# Patient Record
Sex: MALE | Race: WHITE | NOT HISPANIC OR LATINO | URBAN - METROPOLITAN AREA
[De-identification: names, ages, dates, MRNs, and addresses within clinical notes are randomized per-mention and may not be internally consistent; named-entity substitution may affect disease eponyms.]

---

## 2024-10-07 ENCOUNTER — OFFICE VISIT (OUTPATIENT)
Dept: URGENT CARE | Age: 59
End: 2024-10-07
Payer: COMMERCIAL

## 2024-10-07 VITALS
SYSTOLIC BLOOD PRESSURE: 115 MMHG | RESPIRATION RATE: 17 BRPM | HEART RATE: 104 BPM | OXYGEN SATURATION: 98 % | TEMPERATURE: 98.2 F | WEIGHT: 185 LBS | DIASTOLIC BLOOD PRESSURE: 77 MMHG

## 2024-10-07 DIAGNOSIS — R22.41 LOCALIZED SWELLING OF RIGHT FOOT: Primary | ICD-10-CM

## 2024-10-07 PROCEDURE — 99213 OFFICE O/P EST LOW 20 MIN: CPT

## 2024-10-07 ASSESSMENT — ENCOUNTER SYMPTOMS: COLOR CHANGE: 1

## 2024-10-07 NOTE — PROGRESS NOTES
Subjective   Patient ID: Nathanael Ty is a 59 y.o. male. They present today with a chief complaint of Foot Pain, Bleeding/Bruising, and veins swelling in feet.    History of Present Illness  Patient reports varicose veins in right foot and right foot has been swelling and is hard to walk on , no injury.            Past Medical History  Allergies as of 10/07/2024    (No Known Allergies)       (Not in a hospital admission)       No past medical history on file.    No past surgical history on file.         Review of Systems  Review of Systems   Skin:  Positive for color change.   All other systems reviewed and are negative.                                 Objective    Vitals:    10/07/24 1651   BP: 115/77   BP Location: Right arm   Pulse: 104   Resp: 17   Temp: 36.8 °C (98.2 °F)   SpO2: 98%   Weight: 83.9 kg (185 lb)     No LMP for male patient.    Physical Exam  Vitals reviewed.   Constitutional:       Appearance: Normal appearance.   HENT:      Head: Normocephalic.   Cardiovascular:      Rate and Rhythm: Normal rate and regular rhythm.      Pulses: Normal pulses.      Heart sounds: Normal heart sounds.   Pulmonary:      Effort: Pulmonary effort is normal.      Breath sounds: Normal breath sounds.   Musculoskeletal:         General: Swelling and tenderness present. Normal range of motion.      Cervical back: Normal range of motion.      Right lower leg: Edema present.   Skin:     General: Skin is warm and dry.      Capillary Refill: Capillary refill takes less than 2 seconds.      Findings: Erythema present.   Neurological:      General: No focal deficit present.      Mental Status: He is alert and oriented to person, place, and time.   Psychiatric:         Mood and Affect: Mood normal.         Behavior: Behavior normal.         Procedures    Point of Care Test & Imaging Results from this visit  No results found for this visit on 10/07/24.   No results found.    Diagnostic study results (if any) were reviewed by  RADHA Marie.    Assessment/Plan   Allergies, medications, history, and pertinent labs/EKGs/Imaging reviewed by RADHA Marie.     Medical Decision Making  Patient in NAD, VSS, HRR, lungs clear,  Reports he has had history of broken varicose veins bilateral lower extremities, reports right foot and ankle has been swelling, hard to walk on.  Denies chest pain, fever, chills, or shortness of breath.  On exam varicose veins present, no open wounds, no bleeding or drainage.  Patient instructed he needs imaging and possible ultrasound, doppler study and lab work, patient instructed to go to ED for further management, patient understands my recommendations.   Follow up with PCP.       Orders and Diagnoses  There are no diagnoses linked to this encounter.    Medical Admin Record      Patient disposition: ED    Electronically signed by RADHA Marie  5:05 PM